# Patient Record
Sex: FEMALE | Race: WHITE
[De-identification: names, ages, dates, MRNs, and addresses within clinical notes are randomized per-mention and may not be internally consistent; named-entity substitution may affect disease eponyms.]

---

## 2017-12-09 NOTE — PHYS DOC
Past Medical History


Past Medical History:  Anxiety, Depression, High Cholesterol, Hypertension, 

Other


Additional Past Medical Histor:  panick attack


Past Surgical History:  Hysterectomy, Tonsillectomy, Other


Additional Past Surgical Histo:  carotid surgery, back surgery, sinus X2,LASIX


Alcohol Use:  None


Drug Use:  None





Adult General


Chief Complaint


Chief Complaint:  ALTERED MENTAL STATUS





HPI


HPI





Patient is a 84  year old female who presents to the emergency department for 

evaluation lightheadedness and loss of appetite. Patient was brought to the 

emergency department by her family. The patient is noted to live by herself. 

Patient has history of dementia currently an early stage. Patient is on oral 

medication for treatment. Patient's daughter is present with the patient in the 

emergency department. Patient states that she does have generalized fatigue and 

has noted that she has had decreased appetite over the past few weeks. Daughter 

notes that the patient has been spending a significant amount of time in bed 

and not taking care of herself in her current situation. The daughters concern 

that the patient may be dehydrated and does not feel that the patient is safe 

to be living at home. The patient does agree with this as she states that she 

does feel weak at this time. Patient denies any pain and has not had any known 

fevers. Patient states that she has felt unsteady on her feet and has had 

dizziness but denies any recent falls.





Review of Systems


Review of Systems





Constitutional: Lightheadedness, generalized fatigue, denies fever or chills []


Eyes: Denies change in visual acuity, redness, or eye pain []


HENT: Denies nasal congestion or sore throat []


Respiratory: Denies cough or shortness of breath []


Cardiovascular: Denies chest pain or edema[]


GI: Anorexia, denies abdominal pain, nausea, vomiting, bloody stools or 

diarrhea []


: Denies dysuria or hematuria []


Musculoskeletal: Denies back pain or joint pain []


Integument: Denies rash or skin lesions []


Neurologic: Denies headache, focal weakness or sensory changes []








All other systems were reviewed and found to be within normal limits, except as 

documented in this note.





Allergies


Allergies





Allergies








Coded Allergies Type Severity Reaction Last Updated Verified


 


  aspirin Allergy Intermediate  4/14/16 No











Physical Exam


Physical Exam





Constitutional: Alert, afebrile, no acute distress. []


HENT: Normocephalic, atraumatic, bilateral external ears normal, oropharynx dry

, no oral exudates, nose normal. []


Eyes: PERRLA, EOMI, conjunctiva normal, no discharge. [] 


Neck: Normal range of motion, no tenderness, supple, no stridor. [] 


Cardiovascular:Heart rate regular rhythm, no murmur []


Lungs & Thorax:  Bilateral breath sounds clear to auscultation []


Abdomen: Bowel sounds normal, soft, no tenderness, no masses, no pulsatile 

masses. [] 


Skin: Warm, dry, no erythema, no rash. [] 


Back: No tenderness, no CVA tenderness. [] 


Extremities: No tenderness, no cyanosis, no clubbing, ROM intact, no edema. [] 


Neurologic: Alert and oriented X 3, normal motor function, normal sensory 

function, no focal deficits noted. []





Current Patient Data


Vital Signs





 Vital Signs








  Date Time  Temp Pulse Resp B/P (MAP) Pulse Ox O2 Delivery O2 Flow Rate FiO2


 


12/9/17 18:00 97.9 69 18 152/69 (96) 97 Room Air  





 97.9       








Lab Values





 Laboratory Tests








Test


  12/9/17


18:00 12/9/17


18:07


 


Urine Collection Type Unknown   


 


Urine Color Divine   


 


Urine Clarity Cloudy   


 


Urine pH 5.5   


 


Urine Specific Gravity >=1.030   


 


Urine Protein


  30 mg/dL


(NEG-TRACE) 


 


 


Urine Glucose (UA)


  Negative mg/dL


(NEG) 


 


 


Urine Ketones (Stick)


  Trace mg/dL


(NEG) 


 


 


Urine Blood


  Negative (NEG)


  


 


 


Urine Nitrite


  Negative (NEG)


  


 


 


Urine Bilirubin


  Moderate (NEG)


  


 


 


Urine Urobilinogen Dipstick


  1.0 mg/dL (0.2


mg/dL) 


 


 


Urine Leukocyte Esterase Large (NEG)   


 


Urine RBC 0 /HPF (0-2)   


 


Urine WBC


  Tntc /HPF


(0-4) 


 


 


Urine Transitional Epithelial


Cells Mod /LPF  


  


 


 


Urine Bacteria


  0 /HPF (0-FEW)


  


 


 


Urine Hyaline Casts Many /HPF   


 


Urine Mucus Marked /LPF   


 


White Blood Count


  


  8.0 x10^3/uL


(4.0-11.0)


 


Red Blood Count


  


  3.92 x10^6/uL


(3.50-5.40)


 


Hemoglobin


  


  13.8 g/dL


(12.0-15.5)


 


Hematocrit


  


  41.2 %


(36.0-47.0)


 


Mean Corpuscular Volume


  


  105 fL


()  H


 


Mean Corpuscular Hemoglobin  35 pg (25-35)  


 


Mean Corpuscular Hemoglobin


Concent 


  33 g/dL


(31-37)


 


Red Cell Distribution Width


  


  13.6 %


(11.5-14.5)


 


Platelet Count


  


  272 x10^3/uL


(140-400)


 


Neutrophils (%) (Auto)  78 % (31-73)  H


 


Lymphocytes (%) (Auto)  14 % (24-48)  L


 


Monocytes (%) (Auto)  8 % (0-9)  


 


Eosinophils (%) (Auto)  1 % (0-3)  


 


Basophils (%) (Auto)  0 % (0-3)  


 


Neutrophils # (Auto)


  


  6.3 x10^3uL


(1.8-7.7)


 


Lymphocytes # (Auto)


  


  1.1 x10^3/uL


(1.0-4.8)


 


Monocytes # (Auto)


  


  0.6 x10^3/uL


(0.0-1.1)


 


Eosinophils # (Auto)


  


  0.0 x10^3/uL


(0.0-0.7)


 


Basophils # (Auto)


  


  0.0 x10^3/uL


(0.0-0.2)


 


Sodium Level


  


  141 mmol/L


(136-145)


 


Potassium Level


  


  4.1 mmol/L


(3.5-5.1)


 


Chloride Level


  


  103 mmol/L


()


 


Carbon Dioxide Level


  


  27 mmol/L


(21-32)


 


Anion Gap  11 (6-14)  


 


Blood Urea Nitrogen


  


  29 mg/dL


(7-20)  H


 


Creatinine


  


  1.3 mg/dL


(0.6-1.0)  H


 


Estimated GFR


(Cockcroft-Gault) 


  39.0  


 


 


BUN/Creatinine Ratio  22 (6-20)  H


 


Glucose Level


  


  133 mg/dL


(70-99)  H


 


Calcium Level


  


  9.1 mg/dL


(8.5-10.1)


 


Magnesium Level


  


  1.9 mg/dL


(1.8-2.4)


 


Total Bilirubin


  


  0.6 mg/dL


(0.2-1.0)


 


Aspartate Amino Transferase


(AST) 


  22 U/L (15-37)


 


 


Alanine Aminotransferase (ALT)


  


  22 U/L (14-59)


 


 


Alkaline Phosphatase


  


  74 U/L


()


 


Total Protein


  


  7.4 g/dL


(6.4-8.2)


 


Albumin


  


  4.0 g/dL


(3.4-5.0)


 


Albumin/Globulin Ratio  1.2 (1.0-1.7)  


 


Urine Opiates Screen  Neg (NEG)  


 


Urine Methadone Screen  Neg (NEG)  


 


Urine Barbiturates  Neg (NEG)  


 


Urine Phencyclidine Screen  Neg (NEG)  


 


Urine


Amphetamine/Methamphetamine 


  Neg (NEG)  


 


 


Urine Benzodiazepines Screen  Pos (NEG)  


 


Urine Cocaine Screen  Neg (NEG)  


 


Urine Cannabinoids Screen  Neg (NEG)  


 


Urine Ethyl Alcohol  Neg (NEG)  





 Laboratory Tests


12/9/17 18:07








 Laboratory Tests


12/9/17 18:07











EKG


EKG


Interpreted by me: Heart rate 61, sinus rhythm, normal intervals, normal axis, 

no acute ST/T-wave abnormalities present[]





Radiology/Procedures


Radiology/Procedures


Not performed[]





Course & Med Decision Making


Course & Med Decision Making


Pertinent Labs and Imaging studies reviewed. (See chart for details)





Patient's lab work shows evidence of dehydration as well as urinary tract 

infection. Patient started on Rocephin in the emergency department. The patient 

will continue on IV fluids and Rocephin for treatment. Patient admitted to Dr. Begum.





Eloy Disclaimer


Dragon Disclaimer


This electronic medical record was generated, in whole or in part, using a 

voice recognition dictation system.





Departure


Departure


Impression:  


 Primary Impression:  


 Urinary tract infection


 Additional Impressions:  


 Generalized weakness


 Dehydration


Disposition:  09 ADMITTED AS INPATIENT


Admitting Physician:  Other


Condition:  STABLE


Referrals:  


ANA COATES MD (PCP)





Problem Qualifiers








 Primary Impression:  


 Urinary tract infection


 Urinary tract infection type:  site unspecified  Hematuria presence:  without 

hematuria  Qualified Codes:  N39.0 - Urinary tract infection, site not specified








LAISHA ALBERT MD Dec 9, 2017 18:42

## 2017-12-09 NOTE — EKG
[facility]

              [address] [place] [address]

Test Date:    2017               Test Time:    18:36:21

Pat Name:     DALE MORRIS              Department:   

Patient ID:   [id number]           Room:          

Gender:       F                        Technician:   JAYME

:          [id number]               Requested By: LAISHA ALBERT

Order Number: [id number]            Reading MD:   Oswaldo Saavedra MD

                                 Measurements

Intervals                              Axis          

Rate:         61                       P:            75

CO:           140                      QRS:          34

QRSD:         84                       T:            70

QT:           464                                    

QTc:          469                                    

                           Interpretive Statements

SINUS RHYTHM



Electronically Signed On 12- 14:44:08 CST by Oswaldo Saavedra MD

## 2017-12-10 NOTE — HP
ADMIT DATE:  12/09/2017



CHIEF COMPLAINT:  Confusion.



HISTORY OF PRESENT ILLNESS:  The patient is an 84-year-old woman who was brought

in by her family with lightheadedness, loss of appetite and increased confusion.

 The patient actually lives by herself despite having a history of dementia. 

The patient herself relates that she has been fatigued, decreased appetite over

the past few weeks, although this may actually been more months and years. 

Daughter feels like the patient is not safe living at home and the patient at

this time agrees.  The patient herself denies any fevers, chills, focal

weakness, but does have generalized weakness with unsteadiness on her feet as

well as some dizziness, but denies any recent fall.  In the Emergency Room, she

was diagnosed with dehydration as well as signs of UTI and is therefore admitted

for further management.



PAST MEDICAL HISTORY:  Anxiety, depression and hyperlipidemia.  Pelvic fracture

after fall.



PAST SURGICAL HISTORY:  She is status post hysterectomy.



FAMILY HISTORY:  Positive for diabetes.



SOCIAL HISTORY:  Lives by herself.  No toxic habits.  Of note, all histories

were obtained from chart as the patient cannot recall details.



ALLERGIES:  ASPIRIN.



MEDICATIONS:  MAR reconciled with home medications.



REVIEW OF SYSTEMS:  The patient is pleasantly confused, cannot remember why she

is here.  She denies any symptoms in entire organ system review.



PHYSICAL EXAMINATION:

VITAL SIGNS:  From today show a blood pressure of 152/69, heart rate of 69,

respiratory rate at 18, she is afebrile.

GENERAL:  This is an 84-year-old malnourished, frail appearing  woman,

alert, not oriented to time or place, in no acute distress.

HEENT:  Shows no scleral icterus.

NECK:  Supple.

LUNGS:  Clear bilaterally.

HEART:  Regular rate and rhythm without any murmurs.

ABDOMEN:  Positive bowel sounds.

EXTREMITIES:  Show no edema.

SKIN:  Warm, soft and dry without any rash.



LABORATORY DATA:  CBC with a WBC of 8.0, hemoglobin 13.8, MCV of 105, platelets

of 272.  Chemistries with a BUN and creatinine of 29 and 1.3, normal

electrolytes, normal LFTs.  Tox screen positive for benzodiazepines.  Urine with

TNTC wbc's, but 0 bacteria and marked mucus and moderate transitional epithelial

cells.  Specific gravity is greater than 1.030.



IMAGING STUDIES:  None.



ASSESSMENT AND PLAN:  The patient is an 84-year-old  woman who is

slowly failing at home.  The patient and family feel her to be unsafe alone by

herself.  She is brought in by family and has been found with a potential

urinary tract infection.  We will get case management involved.  With acute

issues, there may be a component of encephalopathy on top of her chronic

dementia.  Also, we will have Occupational Therapy, Physical Therapy evaluate

her as to her safety level.  Suspect she may benefit from rehabilitation before

a permanent placement is found for her.  I will continue all her home

medications.  We will hold off on standing order benzodiazepine in favor of

p.r.n. only.

 



______________________________

PREMA LAWS MD



DR:  UR/nts  JOB#:  [id number] / [id number]

DD:  12/09/2017 23:40  DT:  12/10/2017 00:06



ANA Miramontes MD

MTDD

## 2017-12-10 NOTE — PDOC
PROGRESS NOTES


Chief Complaint


Chief Complaint


FTT








ASSESSMENT AND PLAN:


1.  FTT:  OT/PT/nutrition piyush.  SW for placement


2.  Dementia:  advanced.  pleasantly confused and solidly living in her 20s-

30s. minimize sedatives.


3.  ?UTI:  urin cult pending.  empiric ceftriax





History of Present Illness


History of Present Illness


pleasantly confused.  no c/o.  does recall that she is here to go to rehab as 

she can't function at home anymore





Vitals


Vitals





Vital Signs








  Date Time  Temp Pulse Resp B/P (MAP) Pulse Ox O2 Delivery O2 Flow Rate FiO2


 


12/10/17 11:00 97.4 55 18 90/49 (63) 99 Room Air  





 97.4       











Physical Exam


General:  Alert, Cooperative, No acute distress


Heart:  Regular rate


Lungs:  Wheezing





Labs


LABS





Laboratory Tests








Test


  12/9/17


18:00 12/9/17


18:07 12/10/17


04:05


 


Urine Collection Type Unknown   


 


Urine Color Divine   


 


Urine Clarity Cloudy   


 


Urine pH 5.5   


 


Urine Specific Gravity >=1.030   


 


Urine Protein


  30 mg/dL


(NEG-TRACE) 


  


 


 


Urine Glucose (UA)


  Negative mg/dL


(NEG) 


  


 


 


Urine Ketones (Stick)


  Trace mg/dL


(NEG) 


  


 


 


Urine Blood Negative (NEG)   


 


Urine Nitrite Negative (NEG)   


 


Urine Bilirubin Moderate (NEG)   


 


Urine Urobilinogen Dipstick


  1.0 mg/dL (0.2


mg/dL) 


  


 


 


Urine Leukocyte Esterase Large (NEG)   


 


Urine RBC 0 /HPF (0-2)   


 


Urine WBC


  Tntc /HPF


(0-4) 


  


 


 


Urine Transitional Epithelial


Cells Mod /LPF 


  


  


 


 


Urine Bacteria 0 /HPF (0-FEW)   


 


Urine Hyaline Casts Many /HPF   


 


Urine Mucus Marked /LPF   


 


White Blood Count


  


  8.0 x10^3/uL


(4.0-11.0) 7.1 x10^3/uL


(4.0-11.0)


 


Red Blood Count


  


  3.92 x10^6/uL


(3.50-5.40) 3.43 x10^6/uL


(3.50-5.40)


 


Hemoglobin


  


  13.8 g/dL


(12.0-15.5) 12.1 g/dL


(12.0-15.5)


 


Hematocrit


  


  41.2 %


(36.0-47.0) 35.8 %


(36.0-47.0)


 


Mean Corpuscular Volume


  


  105 fL


() 105 fL


()


 


Mean Corpuscular Hemoglobin  35 pg (25-35)  35 pg (25-35) 


 


Mean Corpuscular Hemoglobin


Concent 


  33 g/dL


(31-37) 34 g/dL


(31-37)


 


Red Cell Distribution Width


  


  13.6 %


(11.5-14.5) 13.7 %


(11.5-14.5)


 


Platelet Count


  


  272 x10^3/uL


(140-400) 207 x10^3/uL


(140-400)


 


Neutrophils (%) (Auto)  78 % (31-73)  71 % (31-73) 


 


Lymphocytes (%) (Auto)  14 % (24-48)  19 % (24-48) 


 


Monocytes (%) (Auto)  8 % (0-9)  9 % (0-9) 


 


Eosinophils (%) (Auto)  1 % (0-3)  1 % (0-3) 


 


Basophils (%) (Auto)  0 % (0-3)  0 % (0-3) 


 


Neutrophils # (Auto)


  


  6.3 x10^3uL


(1.8-7.7) 5.0 x10^3uL


(1.8-7.7)


 


Lymphocytes # (Auto)


  


  1.1 x10^3/uL


(1.0-4.8) 1.3 x10^3/uL


(1.0-4.8)


 


Monocytes # (Auto)


  


  0.6 x10^3/uL


(0.0-1.1) 0.7 x10^3/uL


(0.0-1.1)


 


Eosinophils # (Auto)


  


  0.0 x10^3/uL


(0.0-0.7) 0.0 x10^3/uL


(0.0-0.7)


 


Basophils # (Auto)


  


  0.0 x10^3/uL


(0.0-0.2) 0.0 x10^3/uL


(0.0-0.2)


 


Sodium Level


  


  141 mmol/L


(136-145) 142 mmol/L


(136-145)


 


Potassium Level


  


  4.1 mmol/L


(3.5-5.1) 3.9 mmol/L


(3.5-5.1)


 


Chloride Level


  


  103 mmol/L


() 107 mmol/L


()


 


Carbon Dioxide Level


  


  27 mmol/L


(21-32) 27 mmol/L


(21-32)


 


Anion Gap  11 (6-14)  8 (6-14) 


 


Blood Urea Nitrogen


  


  29 mg/dL


(7-20) 27 mg/dL


(7-20)


 


Creatinine


  


  1.3 mg/dL


(0.6-1.0) 1.0 mg/dL


(0.6-1.0)


 


Estimated GFR


(Cockcroft-Gault) 


  39.0 


  52.8 


 


 


BUN/Creatinine Ratio  22 (6-20)  


 


Glucose Level


  


  133 mg/dL


(70-99) 119 mg/dL


(70-99)


 


Calcium Level


  


  9.1 mg/dL


(8.5-10.1) 8.4 mg/dL


(8.5-10.1)


 


Magnesium Level


  


  1.9 mg/dL


(1.8-2.4) 


 


 


Total Bilirubin


  


  0.6 mg/dL


(0.2-1.0) 


 


 


Aspartate Amino Transf


(AST/SGOT) 


  22 U/L (15-37) 


  


 


 


Alanine Aminotransferase


(ALT/SGPT) 


  22 U/L (14-59) 


  


 


 


Alkaline Phosphatase


  


  74 U/L


() 


 


 


Total Protein


  


  7.4 g/dL


(6.4-8.2) 


 


 


Albumin


  


  4.0 g/dL


(3.4-5.0) 


 


 


Albumin/Globulin Ratio  1.2 (1.0-1.7)  


 


Urine Opiates Screen  Neg (NEG)  


 


Urine Methadone Screen  Neg (NEG)  


 


Urine Barbiturates  Neg (NEG)  


 


Urine Phencyclidine Screen  Neg (NEG)  


 


Urine


Amphetamine/Methamphetamine 


  Neg (NEG) 


  


 


 


Urine Benzodiazepines Screen  Pos (NEG)  


 


Urine Cocaine Screen  Neg (NEG)  


 


Urine Cannabinoids Screen  Neg (NEG)  


 


Urine Ethyl Alcohol  Neg (NEG)  

















PREMA LAWS MD Dec 10, 2017 13:01

## 2017-12-11 NOTE — PDOC
PROGRESS NOTES


Chief Complaint


Chief Complaint


FTT








ASSESSMENT AND PLAN:


1.  FTT:  OT/PT/nutrition piyush.  SW for placement


2.  Dementia:  advanced.  pleasantly confused and solidly living in her 20s-

30s. minimize sedatives.


3.  ?UTI:  urine cult pending.  empiric ceftriax


4.  Orthostasis:  IVF bolus;  recheck post.  encourage PO intake;  hold BP meds





History of Present Illness


History of Present Illness


pleasantly confused.  daughter at bed side.  sl dizzy with standing up.





Vitals


Vitals





Vital Signs








  Date Time  Temp Pulse Resp B/P (MAP) Pulse Ox O2 Delivery O2 Flow Rate FiO2


 


12/11/17 12:44    93/59 (70)    


 


12/11/17 11:19  98      


 


12/11/17 08:00      Room Air  


 


12/11/17 07:00 98.1  17  96   





 98.1       











Physical Exam


General:  Alert, Cooperative, No acute distress


Heart:  Regular rate


Lungs:  Wheezing


Extremities:  No edema


Skin:  No rashes











PREMA LAWS MD Dec 11, 2017 13:53

## 2017-12-13 NOTE — PDOC
PROGRESS NOTES


Chief Complaint


Chief Complaint


FTT








ASSESSMENT AND PLAN:


1.  FTT:  OT/PT rec for rehab.  SW working on placement at 


2.  Dementia:  advanced.  pleasantly confused  


3.  ?UTI:  urine cult neg,  empiric ceftriax stopped


4. hypotension:  asymptomatic.  hold antihypertensives; start midodrine





History of Present Illness


History of Present Illness


doing well.  no c/o





Vitals


Vitals





Vital Signs








  Date Time  Temp Pulse Resp B/P (MAP) Pulse Ox O2 Delivery O2 Flow Rate FiO2


 


12/13/17 14:36 97.6 73 18 76/47 (57) 98 Room Air  





 97.6       











Physical Exam


General:  Alert, Cooperative, No acute distress


Heart:  Regular rate


Lungs:  Wheezing


Extremities:  No edema


Skin:  No rashes





Comment


Review of Relevant


I have reviewed the following items javier (where applicable) has been applied.


Labs





Microbiology


12/9/17 Urine Culture - Final, Complete


          


12/9/17 Urine Culture Result 1 (RYAN) - Final, Complete


Medications





Current Medications


Ondansetron HCl (Zofran) 4 mg PRN Q8HRS  PRN IV NAUSEA/VOMITING;  Start 12/9/17 

at 20:15;  Stop 12/10/17 at 20:14;  Status DC


Sodium Chloride 1,000 ml @  125 mls/hr Q8H IV  Last administered on 12/10/17at 

05:49;  Start 12/9/17 at 21:00;  Stop 12/10/17 at 15:16;  Status DC


Acetaminophen (Tylenol) 650 mg PRN Q4HRS  PRN PO FEVER;  Start 12/9/17 at 20:15

;  Stop 12/10/17 at 12:27;  Status DC


Ceftriaxone Sodium 1 gm/ Dextrose 50 ml @  100 mls/hr Q24H IV ;  Start 12/9/17 

at 20:15;  Status UNV


Ceftriaxone Sodium (Rocephin) 1 gm Q24H IVP  Last administered on 12/10/17at 22:

35;  Start 12/9/17 at 21:00;  Stop 12/11/17 at 15:00;  Status DC


Acetaminophen (Tylenol) 650 mg PRN Q6HRS  PRN PO MILD PAIN;  Start 12/9/17 at 23

:45;  Stop 12/10/17 at 12:27;  Status DC


Alprazolam (Xanax) 0.25 mg TID PRN  PRN PO ANXIETY / AGITATION Last 

administered on 12/10/17at 19:46;  Start 12/9/17 at 23:45


EZETIMIBE (Zetia) 10 mg DAILY PO  Last administered on 12/13/17at 09:16;  Start 

12/10/17 at 09:00


Acetaminophen/ Hydrocodone Bitart (Lortab 5/325) 1 tab PRN Q6HRS  PRN PO 

MODERATE PAIN Last administered on 12/12/17at 21:27;  Start 12/9/17 at 23:45


Lisinopril (Prinivil) 20 mg DAILY PO  Last administered on 12/10/17at 09:29;  

Start 12/10/17 at 09:00;  Stop 12/11/17 at 13:50;  Status DC


Polyethylene Glycol (miraLAX PACKET) 17 gm DAILY PO  Last administered on 12/12/ 17at 08:53;  Start 12/10/17 at 09:00


Atorvastatin Calcium (Lipitor) 10 mg QHS PO  Last administered on 12/12/17at 21:

27;  Start 12/10/17 at 21:00


Sertraline HCl (Zoloft) 100 mg DAILY PO  Last administered on 12/13/17at 09:17;

  Start 12/10/17 at 09:00


Acetaminophen (Tylenol) 650 mg PRN Q6HRS  PRN PO MILD PAIN / TEMP;  Start 12/10/

17 at 12:27


Cefpodoxime Proxetil (Vantin) 100 mg BID PO ;  Start 12/10/17 at 13:00;  Stop 12

/10/17 at 13:01;  Status DC


Lactobacillus Rhamnosus (Culturelle) 1 cap BID PO  Last administered on 12/13/ 17at 09:17;  Start 12/10/17 at 21:00


Lorazepam (Ativan) 1 mg PRN Q2HRS  PRN IV ANXIETY / AGITATION Last administered 

on 12/10/17at 21:59;  Start 12/10/17 at 21:00


Haloperidol Lactate (Haldol) 5 mg PRN Q8HRS  PRN IVP AGITATION;  Start 12/10/17 

at 21:00


Sodium Chloride 1,000 ml @  0 mls/hr 1X  ONCE IV  Last administered on 12/11/ 17at 11:45;  Start 12/11/17 at 11:45;  Stop 12/11/17 at 11:46;  Status DC


Lisinopril (Prinivil) 20 mg DAILY PO  Last administered on 12/13/17at 09:16;  

Start 12/12/17 at 09:00


Sodium Chloride 1,000 ml @  0 mls/hr 1X  ONCE IV  Last administered on 12/11/ 17at 14:22;  Start 12/11/17 at 14:30;  Stop 12/11/17 at 14:31;  Status DC


Fludrocortisone Acetate (Florinef) 0.1 mg BID PO  Last administered on 12/13/ 17at 12:17;  Start 12/13/17 at 12:00;  Stop 12/13/17 at 15:50;  Status DC


Sodium Chloride 500 ml @  500 mls/hr 1X  ONCE IV  Last administered on 12/13/ 17at 14:45;  Start 12/13/17 at 14:45;  Stop 12/13/17 at 15:44;  Status DC


Midodrine (Proamatine) 10 mg [id number] PO ;  Start 12/13/17 at 18:00;  Stop 12/13/ 17 at 18:00;  Status DC


Midodrine (Proamatine) 10 mg [id number] PO ;  Start 12/13/17 at 18:00





Active Scripts


Active


Hydrocodone-Apap 5-325  ** (Hydrocodone Bit/Acetaminophen) 1 Each Tablet 1 Tab 

PO PRN Q6HRS PRN


Miralax (Polyethylene Glycol 3350) 17 Gm Powd.pack 1 Packet PO DAILY


Xanax (Alprazolam) 0.25 Mg Tablet 0.25 Mg PO TID PRN PRN


Reported


Namzaric 28 mg-10 mg Capsule (Memantine HCl/Donepezil HCl) 1 Each Cap.spr.24   


Alprazolam 1 Mg Tablet 1 Mg PO DAILY


Ezetimibe 10 Mg Tablet 10 Mg PO DAILY


Acetaminophen 500 Mg Tablet 650 Mg PO PRN Q6HRS PRN


Pravastatin Sodium 40 Mg Tablet 1 Tab PO DAILY


Sertraline Hcl 100 Mg Tablet 100 Mg PO DAILY


Lisinopril 20 Mg Tablet 1 Tab PO DAILY


Vitals/I & O





Vital Sign - Last 24 Hours








 12/12/17 12/12/17 12/12/17 12/12/17





 19:44 20:00 21:27 22:53


 


Temp 98.3   





 98.3   


 


Pulse 87   


 


Resp 16   


 


B/P (MAP) 96/48 (64)   


 


Pulse Ox 96  96 96


 


O2 Delivery Room Air Room Air Room Air Room Air


 


    





    





 12/12/17 12/13/17 12/13/17 12/13/17





 23:52 03:57 07:20 08:00


 


Temp 98.1 98.3 97.9 





 98.1 98.3 97.9 


 


Pulse 103 76 93 


 


Resp 16 16 17 


 


B/P (MAP) 114/71 (85) 117/74 (88) 105/65 (78) 


 


Pulse Ox 96 97 97 


 


O2 Delivery Room Air Room Air Room Air Room Air


 


    





    





 12/13/17 12/13/17 12/13/17 





 09:16 10:40 14:36 


 


Temp  97.4 97.6 





  97.4 97.6 


 


Pulse 93 75 73 


 


Resp  17 18 


 


B/P (MAP) 105/65 93/42 (59) 76/47 (57) 


 


Pulse Ox  100 98 


 


O2 Delivery  Room Air Room Air 














Intake and Output   


 


 12/12/17 12/12/17 12/13/17





 15:00 23:00 07:00


 


Intake Total 440 ml 600 ml 300 ml


 


Balance 440 ml 600 ml 300 ml











Nutrition Consultation


Dietary Evaluation:


Recommendations by RD:  Increase Calorie Intake, Protein supplementation


Comments:  


boost plus tid


Expected Outcomes/Goals:  


to meet > 75% est nutr needs





Malnutrition Findings:


Weight Status:  Appropriate











PREMA LAWS MD Dec 13, 2017 16:48

## 2017-12-13 NOTE — PDOC
PROGRESS NOTES


Chief Complaint


Chief Complaint


FTT





Plz note:  late entry;  pt seen 12/12/29017 with plansfor d/c, but aborted late 

2/2 nonacceptance in [place]











ASSESSMENT AND PLAN:


1.  FTT:  OT/PT rec for rehab.  LIZETT workig on placement at 


2.  Dementia:  advanced.  pleasantly confused  


3.  ?UTI:  urine cult neg  empiric ceftriax stopped


4.  Orthostasis:  IVF bolus;  recheck post.  encourage PO intake;  hold BP meds





History of Present Illness


History of Present Illness


no dizziness, no HA or other c/o





Vitals


Vitals





Vital Signs








  Date Time  Temp Pulse Resp B/P (MAP) Pulse Ox O2 Delivery O2 Flow Rate FiO2


 


12/13/17 14:36 97.6 73 18 76/47 (57) 98 Room Air  





 97.6       











Physical Exam


General:  Alert, Cooperative, No acute distress


Heart:  Regular rate


Lungs:  Wheezing


Extremities:  No edema


Skin:  No rashes





Nutrition Consultation


Dietary Evaluation:


Recommendations by RD:  Increase Calorie Intake, Protein supplementation


Comments:  


boost plus tid


Expected Outcomes/Goals:  


to meet > 75% est nutr needs





Malnutrition Findings:


Weight Status:  Appropriate











PREMA LAWS MD Dec 13, 2017 16:43

## 2017-12-14 NOTE — PDOC
PROGRESS NOTES


Chief Complaint


Chief Complaint


FTT








ASSESSMENT AND PLAN:


1.  FTT:  OT/PT rec for rehab.  SW working on placement at 


2.  Dementia:  advanced.  pleasantly confused  


3.  ?UTI:  urine cult neg,  empiric ceftriax stopped


4. hypotension:  asymptomatic. improved.  no orthostasis. off antihypertensives

; on midodrine 


5.  Dispo: rehab today





History of Present Illness


History of Present Illness


doing well.  no c/o





Vitals


Vitals





Vital Signs








  Date Time  Temp Pulse Resp B/P (MAP) Pulse Ox O2 Delivery O2 Flow Rate FiO2


 


12/14/17 10:45  69 16 88/52 (64) 99 Room Air  


 


12/14/17 10:35 98.0       





 98.0       











Physical Exam


General:  Alert, Cooperative, No acute distress


Heart:  Regular rate


Lungs:  Wheezing


Extremities:  No edema


Skin:  No rashes





Labs


LABS





Laboratory Tests








Test


  12/14/17


05:10


 


White Blood Count


  7.1 x10^3/uL


(4.0-11.0)


 


Red Blood Count


  3.31 x10^6/uL


(3.50-5.40)


 


Hemoglobin


  11.7 g/dL


(12.0-15.5)


 


Hematocrit


  34.8 %


(36.0-47.0)


 


Mean Corpuscular Volume


  105 fL


()


 


Mean Corpuscular Hemoglobin 35 pg (25-35) 


 


Mean Corpuscular Hemoglobin


Concent 34 g/dL


(31-37)


 


Red Cell Distribution Width


  13.4 %


(11.5-14.5)


 


Platelet Count


  188 x10^3/uL


(140-400)


 


Neutrophils (%) (Auto) 63 % (31-73) 


 


Lymphocytes (%) (Auto) 25 % (24-48) 


 


Monocytes (%) (Auto) 9 % (0-9) 


 


Eosinophils (%) (Auto) 2 % (0-3) 


 


Basophils (%) (Auto) 1 % (0-3) 


 


Neutrophils # (Auto)


  4.5 x10^3uL


(1.8-7.7)


 


Lymphocytes # (Auto)


  1.8 x10^3/uL


(1.0-4.8)


 


Monocytes # (Auto)


  0.7 x10^3/uL


(0.0-1.1)


 


Eosinophils # (Auto)


  0.1 x10^3/uL


(0.0-0.7)


 


Basophils # (Auto)


  0.0 x10^3/uL


(0.0-0.2)


 


Sodium Level


  142 mmol/L


(136-145)


 


Potassium Level


  4.3 mmol/L


(3.5-5.1)


 


Chloride Level


  109 mmol/L


()


 


Carbon Dioxide Level


  25 mmol/L


(21-32)


 


Anion Gap 8 (6-14) 


 


Blood Urea Nitrogen


  19 mg/dL


(7-20)


 


Creatinine


  0.9 mg/dL


(0.6-1.0)


 


Estimated GFR


(Cockcroft-Gault) 59.7 


 


 


Glucose Level


  95 mg/dL


(70-99)


 


Calcium Level


  7.8 mg/dL


(8.5-10.1)











Nutrition Consultation


Dietary Evaluation:


Recommendations by RD:  Increase Calorie Intake, Protein supplementation


Comments:  


boost plus tid


Expected Outcomes/Goals:  


to meet > 75% est nutr needs





Malnutrition Findings:


Weight Status:  Appropriate











PREMA LAWS MD Dec 14, 2017 12:13

## 2017-12-17 NOTE — DS
DATE OF DISCHARGE:  12/14/2017



CHIEF COMPLAINT:  Failure to thrive.



HOSPITAL COURSE:  The patient is an 84-year-old cachectic  woman,

presenting from home on the urging of her family because of weakness and

inability to take care of herself.  OT, PT evaluated the patient and recommended

rehabilitation.  She was found significantly demented, although pleasantly

confused.  A urinary tract infection was suspected at admit, but proven not

present and empiric antibiotics were stopped.  She did have hypotension, which,

however, was asymptomatic.  She was started on midodrine for this.  When a

rehabilitation bed was available, she was discharged.





PHYSICAL EXAM:

VS:  stable

GEN:    alert, NAD

PULM:  clear

CV:  RRR

ABD:  BS+, SNT

EXTR:  no edema



DISCHARGE DIAGNOSES:  Failure to thrive, hypotension.



DISCHARGE DISPOSITION:  To rehab.



DISCHARGE CONDITION:  Improved.



DISCHARGE MEDICATIONS:  Please refer to MAR.



DISCHARGE INSTRUCTIONS:  The patient will follow up with PCP upon return to

home.



Greater than 30 minutes were spent in arranging discharge.





______________________________

PREMA LAWS MD



DR:  MATTHIEU/nts  JOB#:  [id number] / [id number]

DD:  12/17/2017 14:43  DT:  12/17/2017 15:13

DAKOTA

## 2019-06-14 VITALS
SYSTOLIC BLOOD PRESSURE: 136 MMHG | DIASTOLIC BLOOD PRESSURE: 63 MMHG | SYSTOLIC BLOOD PRESSURE: 136 MMHG | DIASTOLIC BLOOD PRESSURE: 63 MMHG

## 2019-12-10 ENCOUNTER — HOSPITAL ENCOUNTER (OUTPATIENT)
Dept: HOSPITAL 61 - CT | Age: 84
End: 2019-12-10
Attending: INTERNAL MEDICINE
Payer: MEDICARE

## 2019-12-10 DIAGNOSIS — D18.09: ICD-10-CM

## 2019-12-10 DIAGNOSIS — I10: ICD-10-CM

## 2019-12-10 DIAGNOSIS — I25.10: ICD-10-CM

## 2019-12-10 DIAGNOSIS — R91.1: Primary | ICD-10-CM

## 2019-12-10 DIAGNOSIS — K57.30: ICD-10-CM

## 2019-12-10 DIAGNOSIS — J98.4: ICD-10-CM

## 2019-12-10 DIAGNOSIS — K44.9: ICD-10-CM

## 2019-12-10 DIAGNOSIS — J98.09: ICD-10-CM

## 2019-12-10 PROCEDURE — 71250 CT THORAX DX C-: CPT

## 2019-12-11 NOTE — RAD
Examination: CT CHEST WO CONTRAST

 

History: Lung nodule

 

Comparison/Correlation: 6/14/2019 CT chest without contrast, CT of the 

head and cervical spine without contrast  4/14/2016

 

Findings: Axial images of the chest were obtained without contrast. 

Sagittal and coronal reformatted images were provided.

 

Mild right main bronchomalacia is present.

 

Calcified pleural plaques are present at the anterior right lower lung 

field.

 

Mild right posterior lung base atelectasis or interstitial infiltrate is 

present. No pneumothorax. No pleural or pericardial effusion. Coronary 

arterial calcification is noted. Right upper lung field 0.4 cm diameter 

somewhat ill-defined nodule on axial image 41 at the apical level is 

unchanged.

 

Moderate-sized hiatal hernia is present. Diverticulosis of the colon is 

partially seen.

 

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

 

Impression:

Hiatal hernia.

 

No suspicious pulmonary nodule or mass lesion in the interval. Right 

apical pulmonary nodule described on 6/14/2019 is stable upon correlation 

with 4/14/2016 CT abdomen cervical spine without contrast exam.

 

Right lateral basilar groundglass infiltrate or atelectasis is new in the 

interval. Considerable follow-up to assess for resolution.

 

 

PQRS Compliance Statement:

 

One or more of the following individualized dose reduction techniques were

utilized for this examination:  

1. Automated exposure control  

2. Adjustment of the mA and/or kV according to patient size  

3. Use of iterative reconstruction technique

 

Electronically signed by: Jose M Devries MD (12/11/2019 9:10 AM) [facility]

## 2020-02-04 ENCOUNTER — HOSPITAL ENCOUNTER (OUTPATIENT)
Dept: HOSPITAL 61 - ECHO | Age: 85
Discharge: HOME | End: 2020-02-04
Attending: INTERNAL MEDICINE
Payer: MEDICARE

## 2020-02-04 DIAGNOSIS — I21.4: ICD-10-CM

## 2020-02-04 DIAGNOSIS — I51.7: Primary | ICD-10-CM

## 2020-02-04 PROCEDURE — 93306 TTE W/DOPPLER COMPLETE: CPT

## 2020-02-04 NOTE — CARD
MR#: [id number]

Account#: [id number]

Accession#: [id number]

Date of Study: 02/04/2020

Ordering Physician: ENRIQUE SAAVEDRA, 

Referring Physician: ENRIQUE SAAVEDRA, 

Tech: Salome Yeung





--------------- APPROVED REPORT --------------





EXAM: Two-dimensional and M-mode echocardiogram with Doppler and color Doppler.



Other Information 

Quality : AverageHR: 54bpm



INDICATION

Non STEMI



2D DIMENSIONS 

RVDd3.4 (2.9-3.5cm)Left Atrium(2D)3.2 (1.6-4.0cm)

IVSd1.2 (0.7-1.1cm)Aortic Root(2D)3.5 (2.0-3.7cm)

LVDd5.2 (3.9-5.9cm)LVOT Diameter2.0 (1.8-2.4cm)

PWd1.2 (0.7-1.1cm)LVDs3.7 (2.5-4.0cm)

FS (%) 30.2 %SV75.3 ml

LVEF(%)57.2 (>50%)



Aortic Valve

AoV Peak Fred.150.9cm/sAoV VTI36.6cm

AO Peak GR.9.1mmHgLVOT Peak Fred.109.3cm/s

LVOT  VTI 28.87cmAO Mean GR.5mmHg

JUNI (VMAX)1.69qh3ETT   (VTI)2.38cm2



Mitral Valve

MV E Szfkpisc28.8cm/sMV DECEL [id number]

MV A Ecdeiqks66.1cm/sMV E Mean Gr.2mmHg

MV [id number]/A  Ratio1.2

MVA (PHT)3.21cm2



TDI

E/Lateral E'9.8E/Medial E'10.4



Pulmonary Valve

PV Peak Zpqcrsfr70.9cm/sPV Peak Grad.3mmHg



Tricuspid Valve

TR P. Gynktwwp203ym/sRAP [id number]

TR Peak Gr.60vkWhEHYB91ujKe



Pulmonary Vein

S1 Nqbqjedx99.7cm/sD2 Qzvbgefw01.6cm/s

PVa ayclmwtu037uher



 LEFT VENTRICLE 

The left ventricle is normal size. There is mild concentric left ventricular hypertrophy. The left ve
ntricular systolic function is normal and the ejection fraction is within normal range. The Ejection 
Fraction is 50-55%. There is normal LV segmental wall motion. Transmitral Doppler flow pattern is Gra
de II-pseudonormal filling dynamics.



 RIGHT VENTRICLE 

The right ventricle is normal size. There is normal right ventricular wall thickness. The right ventr
icular systolic function is normal.



 ATRIA 

The left atrium is borderline dilated. The right atrium size is normal. The interatrial septum is int
act with no evidence for an atrial septal defect or patent foramen ovale as noted on 2-D or Doppler i
maging.



 AORTIC VALVE 

The aortic valve is normal in structure and function. Doppler and Color Flow revealed no significant 
aortic regurgitation. There is no significant aortic valvular stenosis.



 MITRAL VALVE 

The mitral valve is normal in structure and function. There is no evidence of mitral valve prolapse. 
There is no mitral valve stenosis. Doppler and Color-flow revealed trace mitral regurgitation.



 TRICUSPID VALVE 

The tricuspid valve is normal in structure and function. Doppler and Color Flow revealed trace tricus
pid regurgitation with an estimated PAP of 33 mmHg. There is no tricuspid valve stenosis.



 PULMONIC VALVE 

The pulmonic valve is not well visualized. Doppler and Color Flow revealed no pulmonic valvular regur
gitation. There is no pulmonic valvular stenosis.



 GREAT VESSELS 

The aortic root is normal in size. The ascending aorta is Mildly dilated at 3.8 cm The IVC is normal 
in size and collapses >50% with inspiration.



 PERICARDIAL EFFUSION 

There is no evidence of significant pericardial effusion.



Critical Notification

Critical Value: No



<Conclusion>

The left ventricular systolic function is normal and the ejection fraction is within normal range. Th
e Ejection Fraction is 50-55%.

There is normal LV segmental wall motion.



Signed by : Enrique Saavedra, 

Electronically Approved : 02/04/2020 12:53:26